# Patient Record
Sex: FEMALE | Race: WHITE
[De-identification: names, ages, dates, MRNs, and addresses within clinical notes are randomized per-mention and may not be internally consistent; named-entity substitution may affect disease eponyms.]

---

## 2018-08-31 VITALS
SYSTOLIC BLOOD PRESSURE: 150 MMHG | DIASTOLIC BLOOD PRESSURE: 78 MMHG | SYSTOLIC BLOOD PRESSURE: 150 MMHG | SYSTOLIC BLOOD PRESSURE: 150 MMHG | DIASTOLIC BLOOD PRESSURE: 78 MMHG | SYSTOLIC BLOOD PRESSURE: 150 MMHG | DIASTOLIC BLOOD PRESSURE: 78 MMHG | DIASTOLIC BLOOD PRESSURE: 78 MMHG | DIASTOLIC BLOOD PRESSURE: 78 MMHG | SYSTOLIC BLOOD PRESSURE: 150 MMHG | SYSTOLIC BLOOD PRESSURE: 150 MMHG | SYSTOLIC BLOOD PRESSURE: 150 MMHG | DIASTOLIC BLOOD PRESSURE: 78 MMHG | SYSTOLIC BLOOD PRESSURE: 150 MMHG | DIASTOLIC BLOOD PRESSURE: 78 MMHG | SYSTOLIC BLOOD PRESSURE: 150 MMHG | DIASTOLIC BLOOD PRESSURE: 78 MMHG | DIASTOLIC BLOOD PRESSURE: 78 MMHG | DIASTOLIC BLOOD PRESSURE: 78 MMHG | SYSTOLIC BLOOD PRESSURE: 150 MMHG

## 2019-08-30 ENCOUNTER — HOSPITAL ENCOUNTER (OUTPATIENT)
Dept: HOSPITAL 61 - KCIC MRI | Age: 84
Discharge: HOME | End: 2019-08-30
Attending: PSYCHIATRY & NEUROLOGY
Payer: MEDICARE

## 2019-08-30 DIAGNOSIS — G30.9: Primary | ICD-10-CM

## 2019-08-30 DIAGNOSIS — F02.80: ICD-10-CM

## 2019-08-30 DIAGNOSIS — N95.9: ICD-10-CM

## 2019-08-30 PROCEDURE — 70551 MRI BRAIN STEM W/O DYE: CPT

## 2019-08-30 NOTE — [facility]
EXAMINATION: Magnetic resonance imaging (MRI) of the brain and brainstem 

without contrast 8/30/2019 1:15 PM

 

HISTORY: New onset headache. Neck pain and limited range of motion.

 

TECHNIQUE: Multiplanar multi-weighted MRI of the brain and brainstem was 

performed without intravenous contrast using the general brain protocol.

 

COMPARISON: None available.

 

FINDINGS:

 

The scalp and calvarium are normal. The superior sagittal sinus 

demonstrates normal venous flow.  The corpus callosum is normal in shape 

and signal intensity. The posterior fossa is unremarkable.  The pituitary 

and sella are normal.  The brainstem and craniocervical junction are 

unremarkable. There are T2/FLAIR signal hyperintense foci in the 

periventricular and subcortical white matter most suggestive of mild 

chronic small vessel ischemic changes.

 

 

Diffusion weighted images reveal no hyperintensities to suggest acute 

cerebral infarction. The susceptibility weighted sequences reveal no 

evidence of acute or chronic hemorrhage. Mild generalized cerebral volume 

loss with prominent ventricles, sulci and basal cisterns.

 

The paranasal sinuses are normal.  The visualized portions of the mastoids

are unremarkable. The orbits appear normal with exception of bilateral 

lens replacement.  Normal flow voids are demonstrated in the carotid 

arteries and basilar artery.

 

IMPRESSION:

 

No evidence for acute or subacute ischemia. No suspicious intracranial 

mass.

 

There are T2/FLAIR signal hyperintense foci in the periventricular and 

subcortical white matter most suggestive of mild chronic small vessel 

ischemic changes.

 

Mild generalized cerebral volume loss.

 

Electronically signed by: Elina Gregg MD (8/30/2019 2:43 PM) 

[facility]

## 2019-10-03 ENCOUNTER — HOSPITAL ENCOUNTER (OUTPATIENT)
Dept: HOSPITAL 61 - KCIC MRI | Age: 84
Discharge: HOME | End: 2019-10-03
Attending: INTERNAL MEDICINE
Payer: MEDICARE

## 2019-10-03 DIAGNOSIS — M50.323: ICD-10-CM

## 2019-10-03 DIAGNOSIS — M48.02: Primary | ICD-10-CM

## 2019-10-03 DIAGNOSIS — G89.29: ICD-10-CM

## 2019-10-03 PROCEDURE — 72141 MRI NECK SPINE W/O DYE: CPT

## 2019-10-03 NOTE — [facility]
MRI of the cervical spine without contrast 10/3/2019

 

CLINICAL HISTORY: Chronic neck pain with headaches.

 

TECHNIQUE: Unenhanced T1-weighted, T2-weighted and inversion recovery 

sagittal and gradient echo and T2-weighted axial images of the cervical 

spine were obtained.

 

FINDINGS: Comparison is made to the patient's CT scan of the cervical 

spine dated 8/31/2018.

 

Images from the study are degraded by patient motion.

 

Mild lateral curvature of the cervical spine is seen convex to the right. 

Degenerative signal changes are seen involving all of the disks of the 

cervical spine. Degenerative signal changes are seen within the marrow 

surrounding these discs. No definite area of abnormal signal intensity is 

seen involving the cervical spinal cord.

 

At the C2-3 disc space there is a mild generalized disc bulge. This is 

eccentric to the right. Degenerative changes are seen involving the 

uncovertebral and facet joints, right greater than left. These findings do

not result in significant central spinal canal or neural foraminal 

stenosis.

 

At the C3-4 disc space there is a mild to moderate generalized disc bulge.

This is eccentric to the right. Degenerative changes are seen involving 

the uncovertebral and facet joints, right greater than left. These 

findings when combined result in mild central spinal canal stenosis 

without evidence of cord impingement. Mild to moderate right neural 

foraminal stenosis is seen.

 

At the C4-5 disc space there is a mild generalized disc bulge. 

Degenerative changes are seen involving the uncovertebral and facet 

joints, left greater than right. These findings when combined result in 

mild central spinal canal stenosis without evidence of cord impingement. 

Mild left neural foraminal stenosis is seen. The right neural foramen is 

patent.

 

At the C5-6 disc space there is a mild generalized disc bulge. 

Degenerative changes are seen involving the uncovertebral and facet joints

bilaterally. These findings when combined result in mild central spinal 

canal stenosis without evidence of cord impingement. No neural foraminal 

stenosis is seen.

 

At the C6-7 disc space there is a mild generalized disc bulge. 

Degenerative changes are seen involving the uncovertebral and facet joints

bilaterally. These findings result in mild central spinal canal stenosis 

without evidence of cord impingement. No neural foraminal stenosis is 

seen.

 

At the C7-T1 disc space there is a minimal generalized disc bulge. 

Degenerative changes are seen involving the facet joints bilaterally. 

These findings do not result in significant central spinal canal or neural

foraminal stenosis.

 

IMPRESSION: Degenerative changes are seen throughout the cervical spine. 

These findings result in mild central spinal canal stenosis at C3-4, C4-5,

C5-6 and C6-7 without evidence of cord impingement. Mild to moderate right

neural foraminal stenosis is seen at C3-4. Mild left neural foraminal 

stenosis is seen at C4-5.

 

Electronically signed by: Jose Daniel Rivas MD (10/3/2019 11:28 AM) [facility]

## 2019-12-16 ENCOUNTER — HOSPITAL ENCOUNTER (OUTPATIENT)
Dept: HOSPITAL 61 - PNCL | Age: 84
Discharge: HOME | End: 2019-12-16
Attending: ANESTHESIOLOGY
Payer: MEDICARE

## 2019-12-16 DIAGNOSIS — M54.2: Primary | ICD-10-CM

## 2019-12-16 DIAGNOSIS — I10: ICD-10-CM

## 2019-12-16 DIAGNOSIS — M79.602: ICD-10-CM

## 2019-12-16 DIAGNOSIS — Z88.2: ICD-10-CM

## 2019-12-16 DIAGNOSIS — M19.90: ICD-10-CM

## 2019-12-16 DIAGNOSIS — Z79.899: ICD-10-CM

## 2019-12-16 PROCEDURE — G0463 HOSPITAL OUTPT CLINIC VISIT: HCPCS

## 2019-12-16 NOTE — PAIN
DATE OF SERVICE:  12/16/2019



INITIAL CONSULTATION FOR PAIN CLINIC



CHIEF COMPLAINT:  Neck and left upper extremity pain.



HISTORY OF PRESENT ILLNESS:  This is an 84-year-old female who presents with

history of pain in the base of the neck on the left side after a fall in her

home driveway in 09/2018.  The patient reports before that she was doing well,

had no significant pain or other problems significantly.  The patient reports

the pain has increased since the fall, constant now, sharp, throbbing, shooting

into the neck and the shoulder on the left, primarily staying in the left neck,

however.  The patient reports that occasionally it radiated to the right

shoulder, upper shoulder, upper arm, but this is rare, usually only with

repetitive motion of the left arm or reaching over her head repetitively with

the left side with her left hand.  The patient reports it awakens up her from

sleep at night about 3 times a night, it causes headaches on the left side as

well as in the posterior occipital region, does not affect her bowel or bladder

control or ability to walk.  She has not tried any other therapies currently,

physical therapy, chiropractic treatments, no other treatments officially.  She

is doing some massage therapy on her own and some heat application, which

decreases the pain to a minor extent.  The patient has tried Tylenol.  She takes

ibuprofen occasionally as well, which does decrease the headaches, but not help

the pain in the neck significantly.  The patient reports a disability rating

from 0-10, 10 being the worst, is a 3 with family home responsibilities and

recreation, 4 with social activity, 1 with occupation, 5 with life support

activities, 0 with self-care activities.  The patient did have an MRI scan of

the cervical spine showing multilevel degenerative changes throughout the

cervical spine resulting in mild central spinal stenosis at C3-C4, C4-C5, C5-C6

and C6-C7 without evidence of cord impingement, mild to moderate right neural

foraminal stenosis at C3-C4 and left neural foraminal stenosis at C4-C5.  The

patient reports no loss of motor function in the left upper extremity, but

fatigability slightly more increased with use of the left arm and she is right

handed.



PAST MEDICAL HISTORY:  Significant for hypertension, pancreatitis, perforated

gastric ulcer in the past, dizziness, headaches, arthritis.  The patient had

surgery for the perforated ulcer with partial gastrectomy by her report.



CURRENT MEDICATIONS:  Include only metoprolol and Plavix.



ALLERGIES:  THE PATIENT IS ALLERGIC TO SULFA.



FAMILY HISTORY:  Significant for pancreatic cancer in the patient's sister.



SOCIAL HISTORY:  The patient does not drink alcohol, does not smoke, does not

use any illegal, illicit or recreational drugs.  She is , lives with her

spouse, has one child, living at home, lives locally in [place], [place].



REVIEW OF SYSTEMS:  The patient's review of systems is positive for those items

mentioned in history of present illness.  All systems reviewed and otherwise

negative.  It is complete, full and well documented on the patient's chart.



PHYSICAL EXAMINATION:

VITAL SIGNS:  The patient's blood pressure 155/97, pulse 72, respirations 18,

temperature 97.9 degrees Fahrenheit, height is 5 feet and 3 inches and weight is

147 pounds.

GENERAL:  The patient is awake, alert, oriented, appropriate, very pleasant

demeanor.

HEENT:  Shows normocephalic and atraumatic.  Extraocular movements are intact

and symmetrical.  Oral cavity:  Mucous membranes moist and pink.  Dentition is

intact.

NECK:  Shows anterior throat is supple without palpable lymphadenopathy noted. 

Swallow reflex symmetrical.

CHEST:  Shows normal on inspection.  Breath sounds are clear to auscultation

bilaterally.

HEART:  Shows S1, S2 clear.  No murmurs auscultated.

ABDOMEN:  Soft, nontender, nondistended.  No palpable organomegaly is noted.  No

rebound or guarding demonstrated.

BACK:  Shows spine grossly in the midline, normal-appearing cervical lordotic

curvature, thoracic kyphotic curvature and lumbar lordotic curvature.  Cervical

paraspinous muscle shows symmetrical on inspection, with palpation shows some

moderate tenderness diffusely bilaterally going diffusely without significant

radiation of pain.  The patient has good rotational motion of cervical spine

with some moderate tenderness with far left lateral rotation and forward

flexion, less so with extension, right lateral rotation is performed past 45

degrees as well without significant difficulty.

EXTREMITIES:  The patient's upper extremities show deep tendon reflexes at 2+ in

the biceps and triceps tendons.  Motor exam is strong with  strength rated

at 4/5 on the left, 5/5 on the right.  Bicep and tricep flexion is 5/5

bilaterally without exacerbation of pain.  Shoulder shrug is strong and intact

without loss of strength on resistance with some minor pain in the base of the

neck on the left side in the superior medial trapezius on the left with

resistance.  This is true with abduction of shoulder to 90 degrees as well with

resistance, but no loss of strength on resistance with either of these

maneuvers.  The patient's peripheral pulses are 2+, radial distribution.  Upper

extremities are warm and dry to touch, equal in color and appearance.  The

patient's skin shows warm and dry, good turgor.  No edema.  No sores, rashes or

bruising throughout.



IMPRESSION:

1.  This is an 84-year-old female with approximate 1-year, 2-month history

status post fall on her driveway with significant pain in the left neck as well

as left upper extremity and shoulder with radicular pattern.

2.  MRI scan of cervical spine as noted.

3.  Hypertension.

4.  Arthritis.

5.  Anticoagulation therapy.



PLAN:  Options were discussed with the patient and the patient's daughter who

accompanied her to visit today including conservative medical managements,

physical therapies and interventional techniques.  She would like to pursue

interventional techniques.  We discussed a cervical epidural steroid injection

using description as well as anatomical models to describe the procedure, but

the patient is currently taking Plavix, we will check with her primary care

physician who prescribes this for her.  She will be safe and acceptable to hold

the Plavix for 7 days prior to cervical epidural steroid injection.  The patient

will be maintained on the medication if determined safe and appropriate.  We

will have her hold this and return for cervical epidural steroid injection at

that time.

 



______________________________

ROSSI RUBIO MD



DR:  JODI/clint  JOB#:  [id number] / [id number]

DD:  12/16/2019 15:22  DT:  12/16/2019 23:58



ALEXA Schreiber MD

## 2019-12-27 ENCOUNTER — HOSPITAL ENCOUNTER (OUTPATIENT)
Dept: HOSPITAL 61 - PNCL | Age: 84
End: 2019-12-27
Attending: ANESTHESIOLOGY
Payer: MEDICARE

## 2019-12-27 DIAGNOSIS — M50.123: Primary | ICD-10-CM

## 2019-12-27 PROCEDURE — 62321 NJX INTERLAMINAR CRV/THRC: CPT

## 2019-12-27 NOTE — PAIN
DATE OF SERVICE:  12/27/2019



PROGRESS NOTE FOR PAIN CLINIC



DIAGNOSIS:  Cervical radiculopathy with cervical degenerative disk disease.



HISTORY OF PRESENT ILLNESS:  The patient is an 84-year-old female who returns

for followup status post initial evaluation and holding Plavix.  She has gained

clearance from her primary care physician to hold that and she has been off of

this now for 7 days.  The patient reports still significant pain in the base of

the neck, more on the left side across the shoulders and arms, upper extremities

and into the left side of the neck and left mastoid region causing some

significant headaches.  The patient reports the pain is aching, sharp and

shooting.  It is a 10 on a scale of 10 at its worst over the past week, 8 on

average, 6 at its least and is an 8 today.  The patient reports no new motor or

sensory deficits, no new changes.



PHYSICAL EXAMINATION:

VITAL SIGNS:  Blood pressure 158/82, pulse 60, respirations 16, temperature 98.2

degrees Fahrenheit.

GENERAL:  The patient is awake, alert, oriented, appropriate, very pleasant

demeanor.  The patient is accompanied by her daughters.

HEENT:  Shows normocephalic, atraumatic.  Extraocular movements are intact and

symmetrical.  Oral cavity:  Mucous membranes moist and pink.  Dentition is

intact.

NECK:  Shows anterior throat supple without palpable lymphadenopathy noted. 

Swallow reflex symmetrical.

CHEST:  Shows normal on inspection.  Breath sounds are clear bilaterally.

HEART:  Shows S1, S2 clear.  No murmurs auscultated.

ABDOMEN:  Soft, nontender, nondistended.  No palpable organomegaly is noted.  No

rebound or guarding demonstrated.

BACK:  Shows spine grossly in the midline.  Slightly exaggerated thoracic

kyphosis.  Cervical lordotic curvature slightly flattened.  Cervical paraspinous

muscle shows symmetrical on inspection, on palpation shows some moderate

tenderness diffusely bilaterally going diffusely without significant radiation. 

The patient has good rotational motion, but significant tenderness with left

lateral rotation at the mastoid process itself.  This is true with extension of

the cervical spine and true with pain in the mastoid region also.

EXTREMITIES:  The patient's upper extremities show deep tendon reflexes 2+ in

the biceps, triceps tendons.  Motor exam is approximately 4 on a scale of 5 with

left  strength and 5/5 on the right.  Peripheral pulses are 2+ radial.  No

peripheral edema is noted bilaterally.



Options were discussed with the patient.  The patient's old chart was reviewed

as her current medication regimen updated.  Current review of systems updated

today as well.  We will proceed with a cervical epidural steroid injection today

with fluoroscopic guidance.  Risks were again discussed including, but not

limited to bleeding, infection, possibility of epidural hematoma, subsequent

neurological compromise, dural puncture, headaches, spinal cord and/or nerve

damage, side effects of steroid medication and poor results regarding pain

control.  The patient understands and wished to proceed.  The patient will

return to clinic in approximately 2 weeks for followup.  She was counseled as to

return appointment, activity level and side effects to be aware of.



DIAGNOSIS:  Cervical radiculopathy with cervical degenerative disk disease.



PROCEDURE:  Cervical epidural steroid injection, translaminar approach C6-C7

level using C-arm fluoroscopic guidance under sterile prep and drape using local

anesthetic.



MEDICATION INJECTED:  A total of 120 mg Depo-Medrol plus 5 mL of

preservative-free normal saline and 2 mL of contrast.



CONDITION AT DISCHARGE:  Stable.  The patient tolerated procedure well, had no

complications.

 



______________________________

ROSSI RUBIO MD



DR:  JODI/clint  JOB#:  [id number] / [id number]

DD:  12/27/2019 09:06  DT:  12/27/2019 09:32

## 2020-01-14 ENCOUNTER — HOSPITAL ENCOUNTER (OUTPATIENT)
Dept: HOSPITAL 61 - PNCL | Age: 85
End: 2020-01-14
Attending: ANESTHESIOLOGY
Payer: MEDICARE

## 2020-01-14 DIAGNOSIS — M79.18: Primary | ICD-10-CM

## 2020-01-14 DIAGNOSIS — M50.10: ICD-10-CM

## 2020-01-14 PROCEDURE — 20553 NJX 1/MLT TRIGGER POINTS 3/>: CPT

## 2020-01-14 NOTE — PAIN
DATE OF SERVICE:  01/14/2020



PROGRESS NOTE FOR PAIN CLINIC



DIAGNOSES:  Cervical radiculopathy with cervical degenerative disk disease,

cervicalgia, and myofascial pain.



HISTORY OF PRESENT ILLNESS:  The patient is an 84-year-old female who returns

for followup status post cervical epidural steroid injection x 1 on 12/27/2019. 

The patient reports no significant improvement in pain, still significant pain

in the base of the neck, mostly on the left side with some crawling sensations

in the left occiput and parietal region.  The patient reports that the pain is

becoming much worse and she is having difficulty moving her neck.  She can only

find one position at night where she lays a pillow on her left side and can be

propped up just to try to get the pain to decrease.  The patient had one day

when her pain was a 5 on a scale of 10, otherwise it has been a 10 or 9 on

average over the past week.  The patient reports that it is a 9 today.  The

patient reports it is sharp, shooting, tingling, radiating, and becoming more

constant in the base of the neck and shoulder, in the left shoulder and upper

extremity as well, mostly in the neck itself.  At the end of the day, she is

able to look down.  She cannot see the table where she is sitting at because of

the pain limiting her forward flexion.  The patient reports no new motor or

sensory deficits or other complaints.



PHYSICAL EXAMINATION:

VITAL SIGNS:  The patient's blood pressure 134/92, pulse 71, respirations 18,

temperature 98.4 degrees Fahrenheit, height is 5 feet 2 inches, and weight is

143 pounds.

GENERAL:  The patient is awake, alert, oriented, and appropriate.  Very pleasant

demeanor.

HEENT:  Head is normocephalic and atraumatic.  Extraocular movements are intact

and symmetrical.  Oral cavity:  Mucous membranes are moist and pink.  Dentition

is intact.

NECK:  Anterior throat supple without palpable lymphadenopathy noted.  Swallow

reflex symmetrical.

CHEST:  Normal on inspection.  Breath sounds clear to auscultation bilaterally.

HEART:  S1, S2 clear.

BACK:  Spine grossly in the midline, slight exaggerated thoracic kyphosis.  Some

flattening of cervical lordotic curvature.  The patient is guarding cervical

motion fairly significantly, especially to the left side with palpation showing

some significant tenderness and very firm rope-like musculature in the left

paraspinous musculature compared to the right and superior, middle and inferior

aspect of the cervical paraspinous muscles, also into the superior medial

trapezius, and anteriorly into the sternocleidomastoid at the mid body to upper

body on the left side only.  Right side shows supple musculature without

significant tenderness.  Left side shows very firm rope-like musculature without

significant radiation, but very firm tenderness over the musculature itself

consistent with trigger point areas of musculature.

EXTREMITIES:  The patient's upper extremities show deep tendon reflexes at 2+ in

biceps, triceps tendons.  Motor exam is approximately 4 on a scale of 5 with

left  strength, bicep and tricep flexion and 5/5 on the right.  Peripheral

pulses are 2+ in radial distribution.  No peripheral edema is noted.



Options were discussed with the patient.  The patient's old chart was reviewed,

as her current medication regimen updated.  Current review of systems updated

today as well.  We will proceed with trigger point injections of the

aforementioned musculature.  Risks were discussed including but not limited to

bleeding, infection, possibility of intravascular injection sequelae, spread of

local anesthetic and numbness, side effects of steroid medication and poor

results regarding pain control.  The patient understands and wished to proceed. 

The patient will return to clinic in approximately 2 weeks for followup.  She

was counseled on return appointment, activity level, and side effects to be

aware of.



DIAGNOSIS:  Myofascial pain.



PROCEDURE:  Trigger point injections in left cervical paraspinous musculature,

left trapezius musculature, and left sternocleidomastoid musculature under

sterile prep and drape using local anesthetic.



MEDICATIONS INJECTED:  A total of 40 mg of Depo-Medrol plus total of 7 mL of

0.25% bupivacaine after negative aspiration at each injection site.



CONDITION AT DISCHARGE:  Stable.  The patient tolerated procedure well and had

no complications.

 



______________________________

ROSSI RUBIO MD



DR:  JODI/clint  JOB#:  [id number] / [id number]

DD:  01/14/2020 09:10  DT:  01/14/2020 10:52

## 2020-01-28 ENCOUNTER — HOSPITAL ENCOUNTER (OUTPATIENT)
Dept: HOSPITAL 61 - PNCL | Age: 85
Discharge: HOME | End: 2020-01-28
Attending: ANESTHESIOLOGY
Payer: MEDICARE

## 2020-01-28 DIAGNOSIS — Z88.1: ICD-10-CM

## 2020-01-28 DIAGNOSIS — M50.11: Primary | ICD-10-CM

## 2020-01-28 DIAGNOSIS — M47.22: ICD-10-CM

## 2020-01-28 DIAGNOSIS — Z98.890: ICD-10-CM

## 2020-01-28 DIAGNOSIS — Z88.8: ICD-10-CM

## 2020-01-28 PROCEDURE — 64491 INJ PARAVERT F JNT C/T 2 LEV: CPT

## 2020-01-28 PROCEDURE — 64490 INJ PARAVERT F JNT C/T 1 LEV: CPT

## 2020-01-28 PROCEDURE — 64492 INJ PARAVERT F JNT C/T 3 LEV: CPT

## 2020-01-28 NOTE — PAIN
DATE OF SERVICE:  01/28/2020



PROGRESS NOTE FOR PAIN CLINIC



DIAGNOSIS:  Cervical radiculopathy with cervical degenerative disk disease and

cervical spondylosis.



HISTORY OF PRESENT ILLNESS:  The patient is an 84-year-old female who returns

for followup status post trigger point injections as well as cervical epidural

steroid injection with only minimal decrease in pain after the trigger point

injections on her last visit on 01/11/2020.  The patient reports still

significant pain in the base of the neck on left side with headaches, wormy

crawling sensation in the left occipital region and left ear.  The patient

reports that the pain is a 9 on a scale of 10 at its worst over the past week, 5

on average, 2 at its least, and is a 5 today.  The patient reports that it is

tingling, aching, sharp, shooting, and becoming more constant in the left side

of the neck.  The patient reports the trigger points helped only temporarily for

the first 1 or 2 days and the pain returned fairly significantly and fairly

quickly.  Again, significant pain in the base of the head that is on the left

side of the upper neck and mid neck and on the left side with difficulty with

rotational motion, especially extension, flexion, and lateral rotation to the

left.  The patient reports that it awakens her from sleep about every 6 hours. 

No new motor or sensory deficits, however.



PHYSICAL EXAMINATION:

VITAL SIGNS:  The patient's blood pressure 148/98, pulse 72, respirations 18,

temperature 98.1 degrees Fahrenheit, height is 5 feet 2 inches, and weight is

143 pounds.

GENERAL:  The patient is awake, alert, oriented, and appropriate.  Very pleasant

demeanor.  The patient is accompanied by her daughter.

HEENT:  Head is normocephalic and atraumatic.  The patient wears eye glasses. 

Extraocular movements are intact and symmetrical.  Oral cavity shows mucous

membranes moist and pink.

NECK:  Shows anterior throat supple.  Swallow reflex symmetrical.

CHEST:  Shows normal on inspection.  Breath sounds are clear bilaterally.

HEART:  Shows S1, S2 clear.

ABDOMEN:  Soft and nontender.

MUSCULOSKELETAL:  Back shows spine grossly in the midline with slight flattening

of cervical lordotic curvature and thoracic kyphosis which is exaggerated. 

Cervical paraspinous muscle shows symmetrical on inspection.  With palpation,

there is a very firm musculature in the left greater than right paraspinous

distribution, mostly in the superior and middle aspect of the cervical

paraspinous musculature, less so in the inferior aspect and into the trapezius

bilaterally.  Very significant tenderness with palpation just under the edge of

the skull on the left side and in the posterior cervical paraspinous musculature

on the left and right side is very minimally tender.  The patient does have

limited rotational motion secondary to pain with left lateral rotation very

guarded and very slow close to 45 degrees.  Right side is better, but still

guarded.  Extension is guarded as well.  Forward flexion is performed with

greater ease and comfort, chin to chest without significant pain reported.

EXTREMITIES:  The patient's upper extremities show deep tendon reflexes 2+ in

the biceps and triceps tendons.  Motor exam is approximately 4 on a scale of 5

on the left with  strength and 5/5 on the right.  Peripheral pulses are 2+

radial.  No peripheral edema is noted.



ASSESSMENT AND PLAN:  Options were discussed with the patient and the patient's

daughter who accompanied her to visit today including continued conservative

medical management and interventional techniques.  We discussed cervical facet

injections on the left side.  The patient's old chart was reviewed, as her

current medication regimen updated.  Current review of systems is updated today

as well.  We will proceed with left-sided cervical facet joint injections at C2,

C3, and C4 levels.  Risks were discussed including but not limited to bleeding,

infection, possibility of epidural hematoma, subsequent neurological compromise,

dural puncture, headaches, spinal cord and/or nerve damage, side effects of

steroid medication, exposure to fluoroscopy, and poor results regarding pain

control.  The patient understands and wished to proceed.  The patient will

return to clinic in approximately 2 weeks for followup.  She was counseled on

return appointment, activity level, and side effects to be aware of.



DIAGNOSIS:  Cervical degenerative disk disease with cervical spondylosis.



PROCEDURE:  Cervical facet medial branch blocks at the C2, C3, and C4 levels

under sterile prep and drape using local anesthetic.



MEDICATION INJECTED:  A total of 3 mL of 0.25% bupivacaine and 1.5 mL total of

contrast.



CONDITION AT DISCHARGE:  Stable.  The patient tolerated the procedure well and

had no complications.

 



______________________________

ROSSI RUBIO MD



DR:  JODI/clint  JOB#:  [id number] / [id number]

DD:  01/28/2020 09:38  DT:  01/28/2020 10:06

## 2020-02-18 ENCOUNTER — HOSPITAL ENCOUNTER (OUTPATIENT)
Dept: HOSPITAL 61 - MRI | Age: 85
End: 2020-02-18
Attending: ANESTHESIOLOGY
Payer: MEDICARE

## 2020-02-18 DIAGNOSIS — M48.061: Primary | ICD-10-CM

## 2020-02-18 DIAGNOSIS — M47.816: ICD-10-CM

## 2020-02-18 PROCEDURE — 72148 MRI LUMBAR SPINE W/O DYE: CPT

## 2020-02-18 NOTE — RAD
LUMBAR SPINE WO CONTRAST

 

History: Bilateral leg pain.

 

Technique: Multiplanar, multi sequential MR imaging was performed of the 

lumbar spine.

 

Comparison: None

 

Findings: 

Grade 2 anterolisthesis L4 on L5. Otherwise, normal alignment. T12 mild 

superior endplate chronic compression fracture. No acute fracture. 

 

Conus terminates at the normal location. No evidence of nerve root 

clumping.

 

L1-L2:  No canal or neuroforaminal narrowing.

 

L2-L3:  Small posterior disc bulge. Mild facet arthropathy. No canal 

narrowing. Mild right neuroforaminal narrowing. No left neuroforaminal 

narrowing.

 

L3-L4:  Small posterior disc bulge. Mild facet arthropathy. Mild 

subarticular recess narrowing. No canal narrowing. Mild mild bilateral 

neural foraminal narrowing.

 

L4-L5:  Anterolisthesis. Disc uncovering. Advanced facet arthropathy. 

Moderate to severe canal narrowing. Severe subarticular recess narrowing. 

Mild bilateral neural foraminal narrowing.

 

L5-S1:  Posterior disc bulge. Moderate right greater than left facet 

arthropathy. No canal narrowing. Mild right neuroforaminal narrowing. No 

left neuroforaminal narrowing.

 

Impression: 

1.  Grade 2 anterolisthesis L4 on L5 due to advanced facet arthropathy 

contributing to moderate to severe canal narrowing.

2.  Additional multilevel lumbar spondylosis with neuroforaminal 

narrowing.

 

Electronically signed by: Lon Stockton DO (2/18/2020 12:42 PM) [facility]

## 2020-02-20 ENCOUNTER — HOSPITAL ENCOUNTER (OUTPATIENT)
Dept: HOSPITAL 61 - PNCL | Age: 85
End: 2020-02-20
Attending: ANESTHESIOLOGY
Payer: MEDICARE

## 2020-02-20 DIAGNOSIS — M48.061: ICD-10-CM

## 2020-02-20 DIAGNOSIS — M51.16: Primary | ICD-10-CM

## 2020-02-20 DIAGNOSIS — M50.10: ICD-10-CM

## 2020-02-20 PROCEDURE — 62323 NJX INTERLAMINAR LMBR/SAC: CPT

## 2020-02-20 NOTE — PAIN
DATE OF SERVICE:  02/20/2020



PROGRESS NOTE FOR PAIN CLINIC



DIAGNOSES:

1.  Cervical radiculopathy with cervical degenerative disk disease and cervical

spondylosis.

2.  Lumbar radiculopathy with lumbar degenerative disk disease and lumbar spinal

stenosis.



HISTORY OF PRESENT ILLNESS:  The patient is an 85-year-old female who returns

for followup status post cervical epidural steroid injection as well as cervical

facet injections on the left at C2, C3, and C4 on her last visit, which was

01/28/2020.  The patient did very well with the neck and improved significantly

about 60%, but her chief complaint is low back and left lower extremity pain. 

We ordered an MRI scan on her dated 02/18/2020 showing some advanced to

moderate-to-severe canal narrowing at L4-L5 and severe subarticular recess

narrowing and mild bilateral neural foraminal narrowing, also posterior disk

bulge as well as disk bulge at L3-L4 and L5-S1 with moderate right greater than

left facet arthropathy at L5-S1 as well with grade 2 anterolisthesis at L4-L5

due to advanced facet arthropathy contributing to moderate-to-severe canal

narrowing.  The patient reports significant pain and discomfort over the past

week or so in the low back, left lower extremity, posterior gluteus,

posterolateral thigh, lateral anterior thigh, anterior medial thigh, was aching

and sharp, shooting, dull, tingling, burning, stabbing, radiating, becoming more

constant, more severe and unbearable, especially with sitting.  The patient

reports it is better with standing, but she has been off of her weight on her

right leg.  The patient reports it awakens her from sleep about every 4 hours. 

The patient reports no loss of motor function, but significant fatigability with

left lower extremity with ambulation and weightbearing of any kind.



PHYSICAL EXAMINATION:

VITAL SIGNS:  The patient's blood pressure is 141/81, pulse 79, respirations are

18, temperature 98.2 degrees Fahrenheit, height is 5 feet 2 inches, weight is

143 pounds.

GENERAL:  The patient is awake, alert, oriented, appropriate, very pleasant

demeanor.  The patient is accompanied by her daughter.

HEENT:  Shows normocephalic, atraumatic.  Extraocular movements are intact and

symmetrical.  Oral cavity shows mucous membranes moist and pink.  Dentition is

intact.

NECK:  Shows anterior throat supple without palpable lymphadenopathy noted. 

Swallow reflex symmetrical.

CHEST:  Shows normal on inspection.  Breath sounds clear bilaterally.

HEART:  Shows S1, S2 clear.

ABDOMEN:  Soft, nontender, nondistended.

BACK:  Shows spine grossly in the midline.  Cervical paraspinous muscle shows

symmetrical on inspection, with palpation some moderate tenderness, but only

diffusely in the low cervical distribution on the left side only with better

rotation of motion compared to previous exam with good extension and flexion and

better left lateral rotation, but still not completely full to past 45 degrees. 

The patient's upper extremities show deep tendon reflexes 2+ in the biceps,

triceps tendons.  Motor exam is strong with 4/5 on the left and 5/5 on the right

with  strength, bicep and tricep flexion.  Peripheral pulses are 2+.  The

patient's low back shows normal appearing lordotic curvature.  Paraspinous

muscle shows symmetrical on inspection, palpation shows some moderate tenderness

diffusely, more on the left than the right, but present bilaterally in the

lumbar paraspinous muscles, but without radiation.  The patient does show good

rotational motion of lumbar spine, both laterally as well as extension and

flexion without significant difficulty or pain reported.  The patient's lower

extremities show deep tendon reflexes 1+ in the patellar and tendo calcaneus

tendons and are equal.  Motor exam is strong with 5/5 dorsiflexion, extension on

the right, 4/5 on the left.  Peripheral pulses are 1+.  No peripheral edema is

noted bilaterally.



Options were discussed with the patient.  The patient's old chart was reviewed

as her current medication regimen updated.  Current review of systems updated

today as well.  We will proceed with a lumbar epidural steroid injection today

with fluoroscopic guidance.  Risks were again discussed including, but not

limited to bleeding, infection, possibility of epidural hematoma, subsequent

neurological compromise, dural puncture, headaches, spinal cord and/or nerve

damage, side effects of steroid medication and poor results regarding pain

control.  The patient understands and wished to proceed.  The patient will

return to clinic in approximately 2 weeks for followup.  She was counseled on

return appointment, activity level and side effects to be aware of.



DIAGNOSIS:  Lumbar radiculopathy with lumbar degenerative disk disease, lumbar

spinal stenosis.



PROCEDURE:  Lumbar epidural steroid injection, translaminar approach L4-L5 level

using C-arm fluoroscopic guidance under sterile prep and drape using local

anesthetic.



MEDICATION INJECTED:  A total of 120 mg Depo-Medrol plus 10 mL of

preservative-free normal saline and 2 mL of contrast.



CONDITION AT DISCHARGE:  Stable.  The patient tolerated the procedure well, had

no complications.

 



______________________________

ROSSI RUBIO MD



DR:  JODI/clint  JOB#:  [id number] / [id number]

DD:  02/20/2020 12:35  DT:  02/20/2020 21:37

## 2020-03-05 ENCOUNTER — HOSPITAL ENCOUNTER (OUTPATIENT)
Dept: HOSPITAL 61 - PNCL | Age: 85
End: 2020-03-05
Attending: ANESTHESIOLOGY
Payer: MEDICARE

## 2020-03-05 DIAGNOSIS — M51.16: Primary | ICD-10-CM

## 2020-03-05 DIAGNOSIS — M47.812: ICD-10-CM

## 2020-03-05 DIAGNOSIS — M48.061: ICD-10-CM

## 2020-03-05 DIAGNOSIS — M50.10: ICD-10-CM

## 2020-03-05 PROCEDURE — 62323 NJX INTERLAMINAR LMBR/SAC: CPT

## 2020-03-06 NOTE — PAIN
DATE OF SERVICE:  03/05/2020



PROGRESS NOTE FOR PAIN CLINIC



DIAGNOSES:

1.  Lumbar radiculopathy with lumbar degenerative disk disease and lumbar spinal

stenosis.

2.  Cervical radiculopathy with cervical degenerative disk disease and cervical

spondylosis.



HISTORY OF PRESENT ILLNESS:  The patient is an 85-year-old female who returns

for followup status post lumbar epidural steroid injection x 1.  The patient

reports only a minor amount of decreased pain in her low back and left lower

extremity.  The patient reports still pinching in the left posterior gluteus,

posterior thigh, posterior calf, lateral calf, lateral and anterior thigh on the

left side only.  The patient reports it is something "pinching" in her leg. 

Sensation is sharp and tight, shooting.  The patient reports it is a 9 on a

scale of 10 at all times, worst, least and average and is a 9 today.  The

patient reports no loss of motor function, but significant fatigability with

left leg.  Also some pain in the base of the neck and the left shoulder, but she

did better after some facet injections in the cervical distribution, but this is

beginning to return minimally as well.  The patient reports no new motor or

sensory deficits, no new bowel or bladder incontinence.



PHYSICAL EXAMINATION:

VITAL SIGNS:  The patient's blood pressure is 133/76, pulse 68, respirations 16,

temperature 97.7 degrees Fahrenheit, height is 5 feet 2 inches.

GENERAL:  The patient is awake, alert, oriented and appropriate, very pleasant

demeanor.

HEENT:  Head shows normocephalic, atraumatic.  Extraocular movements are intact

and symmetrical.  Oral cavity, mucous membranes moist and pink.  Dentition is

intact.

NECK:  Shows anterior throat supple without palpable lymphadenopathy noted. 

Swallow reflex symmetrical.

CHEST:  Shows breath sounds clear to auscultation bilaterally.

HEART:  Shows S1, S2 clear.

ABDOMEN:  Soft, nontender, and nondistended.

BACK:  Shows spine grossly in the midline, slight flattening of cervical

lordotic curvature, somewhat increased thoracic kyphosis and some flattening of

lumbar lordotic curvature.  Cervical paraspinous muscle shows some moderate

tenderness on the left side in the inferior aspect of the cervical paraspinous

musculature and near the mastoid process on the left as well with some tight

musculature, which is moderately firm and moderately tender with palpation, but

without specific trigger points.  The patient's lumbar spine shows lumbar

paraspinous musculature symmetrical on inspection, with palpation shows some

moderate tenderness throughout the upper, middle and lower distribution of

paraspinous muscles bilaterally, but only diffusely without significant

radiation.  The patient has good rotational motion of lumbar spine, both

laterally as well as extension and flexion.

EXTREMITIES:  Lower extremities show deep tendon reflexes 1+ in the patellar and

tendo calcaneus tendons.  Motor exam is approximately 4 on a scale of 5 on the

left and 5/5 on the right with dorsiflexion and extension.  Peripheral pulses

are 1+ in posterior tibia.  No peripheral edema is noted.



Options were discussed with the patient.  The patient's old chart was reviewed

as his current medication regimen updated.  Current review of systems updated

today as well.  We will proceed with lumbar epidural steroid injection today as

the second in this series with fluoroscopic guidance.  Risks were again

discussed including, but not limited to bleeding, infection, possibility of

epidural hematoma, subsequent neurological compromise, dural puncture,

headaches, spinal cord and/or nerve damage, side effects of steroid medication

and poor results regarding pain control.  The patient understands and wished to

proceed.  The patient will return to clinic in approximately 2 weeks for

followup.  He was counseled on return appointment, activity level and side

effects to be aware of.



DIAGNOSIS:  Lumbar radiculopathy with lumbar degenerative disk disease, lumbar

spinal stenosis.



PROCEDURE:  Lumbar epidural steroid injection, translaminar approach at L4-L5

level using C-arm fluoroscopic guidance under sterile prep and drape using local

anesthetic.



MEDICATION INJECTED:  A total of 120 mg Depo-Medrol plus 10 mL of

preservative-free normal saline and 2 mL of contrast.



CONDITION AT DISCHARGE:  Stable.  The patient tolerated procedure well, had no

complications.

 



______________________________

ROSSI RUBIO MD



DR:  JODI/clint  JOB#:  [id number] / [id number]

DD:  03/05/2020 14:08  DT:  03/05/2020 20:57

## 2020-08-10 ENCOUNTER — HOSPITAL ENCOUNTER (EMERGENCY)
Dept: HOSPITAL 61 - ER | Age: 85
Discharge: LEFT BEFORE DISCHARGE | End: 2020-08-10
Payer: MEDICARE

## 2020-08-10 DIAGNOSIS — Z53.21: ICD-10-CM

## 2020-08-10 DIAGNOSIS — R42: Primary | ICD-10-CM
